# Patient Record
Sex: FEMALE | Race: WHITE | NOT HISPANIC OR LATINO | Employment: OTHER | URBAN - METROPOLITAN AREA
[De-identification: names, ages, dates, MRNs, and addresses within clinical notes are randomized per-mention and may not be internally consistent; named-entity substitution may affect disease eponyms.]

---

## 2017-01-01 ENCOUNTER — HOSPITAL ENCOUNTER (INPATIENT)
Facility: HOSPITAL | Age: 82
LOS: 5 days | Discharge: NURSING, REHAB OR LONG-TERM CARE | DRG: 683 | End: 2017-01-06
Attending: EMERGENCY MEDICINE | Admitting: FAMILY MEDICINE
Payer: COMMERCIAL

## 2017-01-01 ENCOUNTER — APPOINTMENT (EMERGENCY)
Dept: CT IMAGING | Facility: HOSPITAL | Age: 82
DRG: 683 | End: 2017-01-01
Payer: COMMERCIAL

## 2017-01-01 ENCOUNTER — APPOINTMENT (EMERGENCY)
Dept: RADIOLOGY | Facility: HOSPITAL | Age: 82
DRG: 683 | End: 2017-01-01
Payer: COMMERCIAL

## 2017-01-01 DIAGNOSIS — N39.0 URINARY TRACT INFECTION: Primary | ICD-10-CM

## 2017-01-01 DIAGNOSIS — N17.9 ACUTE KIDNEY INJURY (HCC): ICD-10-CM

## 2017-01-01 DIAGNOSIS — I35.0 AORTIC VALVE STENOSIS, UNSPECIFIED ETIOLOGY: ICD-10-CM

## 2017-01-01 DIAGNOSIS — G93.41 METABOLIC ENCEPHALOPATHY: ICD-10-CM

## 2017-01-01 DIAGNOSIS — I24.8 DEMAND ISCHEMIA (HCC): ICD-10-CM

## 2017-01-01 DIAGNOSIS — R77.8 ELEVATED TROPONIN: ICD-10-CM

## 2017-01-01 PROBLEM — D69.6 THROMBOCYTOPENIA (HCC): Status: ACTIVE | Noted: 2017-01-01

## 2017-01-01 LAB
ALBUMIN SERPL BCP-MCNC: 3.1 G/DL (ref 3.5–5)
ALP SERPL-CCNC: 60 U/L (ref 46–116)
ALT SERPL W P-5'-P-CCNC: 15 U/L (ref 12–78)
ANION GAP SERPL CALCULATED.3IONS-SCNC: 11 MMOL/L (ref 4–13)
AST SERPL W P-5'-P-CCNC: 26 U/L (ref 5–45)
BACTERIA UR QL AUTO: ABNORMAL /HPF
BASOPHILS # BLD AUTO: 0.02 THOUSANDS/ΜL (ref 0–0.1)
BASOPHILS NFR BLD AUTO: 0 % (ref 0–1)
BILIRUB SERPL-MCNC: 0.6 MG/DL (ref 0.2–1)
BILIRUB UR QL STRIP: NEGATIVE
BUN SERPL-MCNC: 56 MG/DL (ref 5–25)
CALCIUM SERPL-MCNC: 8.5 MG/DL (ref 8.3–10.1)
CHLORIDE SERPL-SCNC: 107 MMOL/L (ref 100–108)
CK MB SERPL-MCNC: 1.8 % (ref 0–2.5)
CK MB SERPL-MCNC: 3.4 NG/ML (ref 0–5)
CK SERPL-CCNC: 187 U/L (ref 26–192)
CLARITY UR: CLEAR
CO2 SERPL-SCNC: 25 MMOL/L (ref 21–32)
COLOR UR: YELLOW
CREAT SERPL-MCNC: 1.91 MG/DL (ref 0.6–1.3)
EOSINOPHIL # BLD AUTO: 0.08 THOUSAND/ΜL (ref 0–0.61)
EOSINOPHIL NFR BLD AUTO: 1 % (ref 0–6)
ERYTHROCYTE [DISTWIDTH] IN BLOOD BY AUTOMATED COUNT: 17.7 % (ref 11.6–15.1)
GFR SERPL CREATININE-BSD FRML MDRD: 24.9 ML/MIN/1.73SQ M
GLUCOSE SERPL-MCNC: 100 MG/DL (ref 65–140)
GLUCOSE SERPL-MCNC: 163 MG/DL (ref 65–140)
GLUCOSE SERPL-MCNC: 87 MG/DL (ref 65–140)
GLUCOSE UR STRIP-MCNC: NEGATIVE MG/DL
HCT VFR BLD AUTO: 30.1 % (ref 34.8–46.1)
HGB BLD-MCNC: 9.3 G/DL (ref 11.5–15.4)
HGB UR QL STRIP.AUTO: ABNORMAL
KETONES UR STRIP-MCNC: NEGATIVE MG/DL
LEUKOCYTE ESTERASE UR QL STRIP: ABNORMAL
LYMPHOCYTES # BLD AUTO: 2.2 THOUSANDS/ΜL (ref 0.6–4.47)
LYMPHOCYTES NFR BLD AUTO: 24 % (ref 14–44)
MCH RBC QN AUTO: 26.9 PG (ref 26.8–34.3)
MCHC RBC AUTO-ENTMCNC: 30.9 G/DL (ref 31.4–37.4)
MCV RBC AUTO: 87 FL (ref 82–98)
MONOCYTES # BLD AUTO: 0.83 THOUSAND/ΜL (ref 0.17–1.22)
MONOCYTES NFR BLD AUTO: 9 % (ref 4–12)
NEUTROPHILS # BLD AUTO: 5.94 THOUSANDS/ΜL (ref 1.85–7.62)
NEUTS SEG NFR BLD AUTO: 65 % (ref 43–75)
NITRITE UR QL STRIP: NEGATIVE
NON-SQ EPI CELLS URNS QL MICRO: ABNORMAL /HPF
NRBC BLD AUTO-RTO: 0 /100 WBCS
PH UR STRIP.AUTO: 5.5 [PH] (ref 4.5–8)
PLATELET # BLD AUTO: 132 THOUSANDS/UL (ref 149–390)
PLATELET # BLD AUTO: 135 THOUSANDS/UL (ref 149–390)
PMV BLD AUTO: 13.3 FL (ref 8.9–12.7)
PMV BLD AUTO: 13.9 FL (ref 8.9–12.7)
POTASSIUM SERPL-SCNC: 4.7 MMOL/L (ref 3.5–5.3)
PROT SERPL-MCNC: 6.9 G/DL (ref 6.4–8.2)
PROT UR STRIP-MCNC: NEGATIVE MG/DL
RBC # BLD AUTO: 3.46 MILLION/UL (ref 3.81–5.12)
RBC #/AREA URNS AUTO: ABNORMAL /HPF
SODIUM SERPL-SCNC: 143 MMOL/L (ref 136–145)
SP GR UR STRIP.AUTO: 1.02 (ref 1–1.03)
TROPONIN I SERPL-MCNC: 0.08 NG/ML
TROPONIN I SERPL-MCNC: 0.08 NG/ML
TROPONIN I SERPL-MCNC: 0.12 NG/ML
UROBILINOGEN UR QL STRIP.AUTO: 0.2 E.U./DL
WBC # BLD AUTO: 9.11 THOUSAND/UL (ref 4.31–10.16)
WBC #/AREA URNS AUTO: ABNORMAL /HPF
WBC CLUMPS # UR AUTO: PRESENT /UL

## 2017-01-01 PROCEDURE — 93005 ELECTROCARDIOGRAM TRACING: CPT | Performed by: EMERGENCY MEDICINE

## 2017-01-01 PROCEDURE — 84484 ASSAY OF TROPONIN QUANT: CPT | Performed by: FAMILY MEDICINE

## 2017-01-01 PROCEDURE — 82948 REAGENT STRIP/BLOOD GLUCOSE: CPT

## 2017-01-01 PROCEDURE — 70450 CT HEAD/BRAIN W/O DYE: CPT

## 2017-01-01 PROCEDURE — 87086 URINE CULTURE/COLONY COUNT: CPT | Performed by: EMERGENCY MEDICINE

## 2017-01-01 PROCEDURE — 71010 HB CHEST X-RAY 1 VIEW FRONTAL (PORTABLE): CPT

## 2017-01-01 PROCEDURE — 36415 COLL VENOUS BLD VENIPUNCTURE: CPT | Performed by: EMERGENCY MEDICINE

## 2017-01-01 PROCEDURE — 82553 CREATINE MB FRACTION: CPT | Performed by: EMERGENCY MEDICINE

## 2017-01-01 PROCEDURE — 85049 AUTOMATED PLATELET COUNT: CPT | Performed by: FAMILY MEDICINE

## 2017-01-01 PROCEDURE — 80053 COMPREHEN METABOLIC PANEL: CPT | Performed by: EMERGENCY MEDICINE

## 2017-01-01 PROCEDURE — 81001 URINALYSIS AUTO W/SCOPE: CPT | Performed by: EMERGENCY MEDICINE

## 2017-01-01 PROCEDURE — 99285 EMERGENCY DEPT VISIT HI MDM: CPT

## 2017-01-01 PROCEDURE — 72125 CT NECK SPINE W/O DYE: CPT

## 2017-01-01 PROCEDURE — 84484 ASSAY OF TROPONIN QUANT: CPT | Performed by: EMERGENCY MEDICINE

## 2017-01-01 PROCEDURE — 73502 X-RAY EXAM HIP UNI 2-3 VIEWS: CPT

## 2017-01-01 PROCEDURE — 82550 ASSAY OF CK (CPK): CPT | Performed by: EMERGENCY MEDICINE

## 2017-01-01 PROCEDURE — 85025 COMPLETE CBC W/AUTO DIFF WBC: CPT | Performed by: EMERGENCY MEDICINE

## 2017-01-01 RX ORDER — ATORVASTATIN CALCIUM 40 MG/1
40 TABLET, FILM COATED ORAL DAILY
Status: DISCONTINUED | OUTPATIENT
Start: 2017-01-01 | End: 2017-01-06 | Stop reason: HOSPADM

## 2017-01-01 RX ORDER — CHOLESTYRAMINE LIGHT 4 G/5.7G
4 POWDER, FOR SUSPENSION ORAL 3 TIMES DAILY
Status: DISCONTINUED | OUTPATIENT
Start: 2017-01-01 | End: 2017-01-06 | Stop reason: HOSPADM

## 2017-01-01 RX ORDER — DOXAZOSIN MESYLATE 1 MG/1
1 TABLET ORAL
Status: DISCONTINUED | OUTPATIENT
Start: 2017-01-01 | End: 2017-01-06 | Stop reason: HOSPADM

## 2017-01-01 RX ORDER — LATANOPROST 50 UG/ML
1 SOLUTION/ DROPS OPHTHALMIC
Status: DISCONTINUED | OUTPATIENT
Start: 2017-01-01 | End: 2017-01-06 | Stop reason: HOSPADM

## 2017-01-01 RX ORDER — METOPROLOL SUCCINATE 25 MG/1
25 TABLET, EXTENDED RELEASE ORAL DAILY
Status: DISCONTINUED | OUTPATIENT
Start: 2017-01-01 | End: 2017-01-06 | Stop reason: HOSPADM

## 2017-01-01 RX ORDER — ACETAMINOPHEN 325 MG/1
650 TABLET ORAL EVERY 6 HOURS PRN
Status: DISCONTINUED | OUTPATIENT
Start: 2017-01-01 | End: 2017-01-06 | Stop reason: HOSPADM

## 2017-01-01 RX ORDER — SODIUM CHLORIDE 9 MG/ML
75 INJECTION, SOLUTION INTRAVENOUS CONTINUOUS
Status: DISCONTINUED | OUTPATIENT
Start: 2017-01-01 | End: 2017-01-02

## 2017-01-01 RX ORDER — HEPARIN SODIUM 5000 [USP'U]/ML
5000 INJECTION, SOLUTION INTRAVENOUS; SUBCUTANEOUS EVERY 8 HOURS SCHEDULED
Status: DISCONTINUED | OUTPATIENT
Start: 2017-01-01 | End: 2017-01-06 | Stop reason: HOSPADM

## 2017-01-01 RX ORDER — NYSTATIN 100000 [USP'U]/G
1 POWDER TOPICAL 2 TIMES DAILY
Status: DISCONTINUED | OUTPATIENT
Start: 2017-01-01 | End: 2017-01-06 | Stop reason: HOSPADM

## 2017-01-01 RX ORDER — ASPIRIN 81 MG/1
81 TABLET, CHEWABLE ORAL DAILY
Status: DISCONTINUED | OUTPATIENT
Start: 2017-01-01 | End: 2017-01-06 | Stop reason: HOSPADM

## 2017-01-01 RX ORDER — ONDANSETRON 2 MG/ML
4 INJECTION INTRAMUSCULAR; INTRAVENOUS EVERY 6 HOURS PRN
Status: DISCONTINUED | OUTPATIENT
Start: 2017-01-01 | End: 2017-01-06 | Stop reason: HOSPADM

## 2017-01-01 RX ORDER — OXYBUTYNIN CHLORIDE 5 MG/1
20 TABLET, EXTENDED RELEASE ORAL DAILY
Status: DISCONTINUED | OUTPATIENT
Start: 2017-01-02 | End: 2017-01-06 | Stop reason: HOSPADM

## 2017-01-01 RX ADMIN — CHOLESTYRAMINE 4 G: 4 POWDER, FOR SUSPENSION ORAL at 17:29

## 2017-01-01 RX ADMIN — SODIUM CHLORIDE 75 ML/HR: 0.9 INJECTION, SOLUTION INTRAVENOUS at 18:08

## 2017-01-01 RX ADMIN — METOPROLOL SUCCINATE 25 MG: 25 TABLET, EXTENDED RELEASE ORAL at 17:29

## 2017-01-01 RX ADMIN — HEPARIN SODIUM 5000 UNITS: 5000 INJECTION, SOLUTION INTRAVENOUS; SUBCUTANEOUS at 21:56

## 2017-01-01 RX ADMIN — ATORVASTATIN CALCIUM 40 MG: 40 TABLET, FILM COATED ORAL at 17:29

## 2017-01-01 RX ADMIN — CEFAZOLIN SODIUM 500 MG: 1 INJECTION, POWDER, FOR SOLUTION INTRAMUSCULAR; INTRAVENOUS at 18:15

## 2017-01-01 RX ADMIN — ASPIRIN 81 MG CHEWABLE TABLET 81 MG: 81 TABLET CHEWABLE at 17:29

## 2017-01-01 RX ADMIN — DOXAZOSIN MESYLATE 1 MG: 1 TABLET ORAL at 22:01

## 2017-01-01 RX ADMIN — LATANOPROST 1 DROP: 50 SOLUTION OPHTHALMIC at 21:56

## 2017-01-01 RX ADMIN — ACETAMINOPHEN 650 MG: 325 TABLET ORAL at 22:00

## 2017-01-01 RX ADMIN — SODIUM CHLORIDE 1000 ML: 0.9 INJECTION, SOLUTION INTRAVENOUS at 12:25

## 2017-01-01 RX ADMIN — HEPARIN SODIUM 5000 UNITS: 5000 INJECTION, SOLUTION INTRAVENOUS; SUBCUTANEOUS at 18:17

## 2017-01-01 RX ADMIN — CEFTRIAXONE 1000 MG: 1 INJECTION, SOLUTION INTRAVENOUS at 12:23

## 2017-01-02 LAB
ANION GAP SERPL CALCULATED.3IONS-SCNC: 8 MMOL/L (ref 4–13)
BUN SERPL-MCNC: 33 MG/DL (ref 5–25)
CALCIUM SERPL-MCNC: 7.8 MG/DL (ref 8.3–10.1)
CHLORIDE SERPL-SCNC: 113 MMOL/L (ref 100–108)
CO2 SERPL-SCNC: 25 MMOL/L (ref 21–32)
CREAT SERPL-MCNC: 1.28 MG/DL (ref 0.6–1.3)
ERYTHROCYTE [DISTWIDTH] IN BLOOD BY AUTOMATED COUNT: 17.7 % (ref 11.6–15.1)
GFR SERPL CREATININE-BSD FRML MDRD: 39.4 ML/MIN/1.73SQ M
GLUCOSE SERPL-MCNC: 109 MG/DL (ref 65–140)
GLUCOSE SERPL-MCNC: 131 MG/DL (ref 65–140)
GLUCOSE SERPL-MCNC: 154 MG/DL (ref 65–140)
GLUCOSE SERPL-MCNC: 87 MG/DL (ref 65–140)
GLUCOSE SERPL-MCNC: 90 MG/DL (ref 65–140)
HCT VFR BLD AUTO: 29.1 % (ref 34.8–46.1)
HGB BLD-MCNC: 8.7 G/DL (ref 11.5–15.4)
MCH RBC QN AUTO: 26.3 PG (ref 26.8–34.3)
MCHC RBC AUTO-ENTMCNC: 29.9 G/DL (ref 31.4–37.4)
MCV RBC AUTO: 88 FL (ref 82–98)
PLATELET # BLD AUTO: 142 THOUSANDS/UL (ref 149–390)
PMV BLD AUTO: 12.8 FL (ref 8.9–12.7)
POTASSIUM SERPL-SCNC: 4.2 MMOL/L (ref 3.5–5.3)
RBC # BLD AUTO: 3.31 MILLION/UL (ref 3.81–5.12)
SODIUM SERPL-SCNC: 146 MMOL/L (ref 136–145)
TROPONIN I SERPL-MCNC: 0.2 NG/ML
WBC # BLD AUTO: 7.11 THOUSAND/UL (ref 4.31–10.16)

## 2017-01-02 PROCEDURE — 84484 ASSAY OF TROPONIN QUANT: CPT | Performed by: NURSE PRACTITIONER

## 2017-01-02 PROCEDURE — 80048 BASIC METABOLIC PNL TOTAL CA: CPT | Performed by: FAMILY MEDICINE

## 2017-01-02 PROCEDURE — 85027 COMPLETE CBC AUTOMATED: CPT | Performed by: FAMILY MEDICINE

## 2017-01-02 PROCEDURE — 82948 REAGENT STRIP/BLOOD GLUCOSE: CPT

## 2017-01-02 RX ADMIN — HEPARIN SODIUM 5000 UNITS: 5000 INJECTION, SOLUTION INTRAVENOUS; SUBCUTANEOUS at 05:28

## 2017-01-02 RX ADMIN — NYSTATIN 1 APPLICATION: 100000 POWDER TOPICAL at 17:00

## 2017-01-02 RX ADMIN — CHOLESTYRAMINE 4 G: 4 POWDER, FOR SUSPENSION ORAL at 16:58

## 2017-01-02 RX ADMIN — METOPROLOL SUCCINATE 25 MG: 25 TABLET, EXTENDED RELEASE ORAL at 09:02

## 2017-01-02 RX ADMIN — ASPIRIN 81 MG CHEWABLE TABLET 81 MG: 81 TABLET CHEWABLE at 09:02

## 2017-01-02 RX ADMIN — LATANOPROST 1 DROP: 50 SOLUTION OPHTHALMIC at 22:18

## 2017-01-02 RX ADMIN — CHOLESTYRAMINE 4 G: 4 POWDER, FOR SUSPENSION ORAL at 22:16

## 2017-01-02 RX ADMIN — CEFAZOLIN SODIUM 500 MG: 1 INJECTION, POWDER, FOR SOLUTION INTRAMUSCULAR; INTRAVENOUS at 16:52

## 2017-01-02 RX ADMIN — CEFAZOLIN SODIUM 500 MG: 1 INJECTION, POWDER, FOR SOLUTION INTRAMUSCULAR; INTRAVENOUS at 04:05

## 2017-01-02 RX ADMIN — HEPARIN SODIUM 5000 UNITS: 5000 INJECTION, SOLUTION INTRAVENOUS; SUBCUTANEOUS at 22:19

## 2017-01-02 RX ADMIN — SODIUM CHLORIDE 75 ML/HR: 0.9 INJECTION, SOLUTION INTRAVENOUS at 10:05

## 2017-01-02 RX ADMIN — INSULIN LISPRO 1 UNITS: 100 INJECTION, SOLUTION INTRAVENOUS; SUBCUTANEOUS at 11:53

## 2017-01-02 RX ADMIN — OXYBUTYNIN CHLORIDE 20 MG: 5 TABLET, EXTENDED RELEASE ORAL at 09:02

## 2017-01-02 RX ADMIN — CHOLESTYRAMINE 4 G: 4 POWDER, FOR SUSPENSION ORAL at 09:02

## 2017-01-02 RX ADMIN — ATORVASTATIN CALCIUM 40 MG: 40 TABLET, FILM COATED ORAL at 09:02

## 2017-01-02 RX ADMIN — HEPARIN SODIUM 5000 UNITS: 5000 INJECTION, SOLUTION INTRAVENOUS; SUBCUTANEOUS at 13:23

## 2017-01-02 RX ADMIN — NYSTATIN 1 APPLICATION: 100000 POWDER TOPICAL at 08:54

## 2017-01-02 RX ADMIN — DOXAZOSIN MESYLATE 1 MG: 1 TABLET ORAL at 22:19

## 2017-01-03 LAB
ANION GAP SERPL CALCULATED.3IONS-SCNC: 6 MMOL/L (ref 4–13)
ATRIAL RATE: 99 BPM
BACTERIA UR CULT: NORMAL
BUN SERPL-MCNC: 24 MG/DL (ref 5–25)
CALCIUM SERPL-MCNC: 7.8 MG/DL (ref 8.3–10.1)
CHLORIDE SERPL-SCNC: 112 MMOL/L (ref 100–108)
CO2 SERPL-SCNC: 24 MMOL/L (ref 21–32)
CREAT SERPL-MCNC: 1.04 MG/DL (ref 0.6–1.3)
ERYTHROCYTE [DISTWIDTH] IN BLOOD BY AUTOMATED COUNT: 17.3 % (ref 11.6–15.1)
GFR SERPL CREATININE-BSD FRML MDRD: 50.1 ML/MIN/1.73SQ M
GLUCOSE SERPL-MCNC: 101 MG/DL (ref 65–140)
GLUCOSE SERPL-MCNC: 130 MG/DL (ref 65–140)
GLUCOSE SERPL-MCNC: 134 MG/DL (ref 65–140)
GLUCOSE SERPL-MCNC: 166 MG/DL (ref 65–140)
GLUCOSE SERPL-MCNC: 89 MG/DL (ref 65–140)
HCT VFR BLD AUTO: 27.5 % (ref 34.8–46.1)
HGB BLD-MCNC: 8.6 G/DL (ref 11.5–15.4)
MCH RBC QN AUTO: 27.7 PG (ref 26.8–34.3)
MCHC RBC AUTO-ENTMCNC: 31.3 G/DL (ref 31.4–37.4)
MCV RBC AUTO: 88 FL (ref 82–98)
P AXIS: 55 DEGREES
PLATELET # BLD AUTO: 131 THOUSANDS/UL (ref 149–390)
PMV BLD AUTO: 12 FL (ref 8.9–12.7)
POTASSIUM SERPL-SCNC: 4.3 MMOL/L (ref 3.5–5.3)
PR INTERVAL: 204 MS
QRS AXIS: 45 DEGREES
QRSD INTERVAL: 102 MS
QT INTERVAL: 376 MS
QTC INTERVAL: 482 MS
RBC # BLD AUTO: 3.11 MILLION/UL (ref 3.81–5.12)
SODIUM SERPL-SCNC: 142 MMOL/L (ref 136–145)
T WAVE AXIS: -6 DEGREES
VENTRICULAR RATE: 99 BPM
WBC # BLD AUTO: 6.46 THOUSAND/UL (ref 4.31–10.16)

## 2017-01-03 PROCEDURE — 82948 REAGENT STRIP/BLOOD GLUCOSE: CPT

## 2017-01-03 PROCEDURE — 80048 BASIC METABOLIC PNL TOTAL CA: CPT | Performed by: NURSE PRACTITIONER

## 2017-01-03 PROCEDURE — G8979 MOBILITY GOAL STATUS: HCPCS

## 2017-01-03 PROCEDURE — 92610 EVALUATE SWALLOWING FUNCTION: CPT

## 2017-01-03 PROCEDURE — 97163 PT EVAL HIGH COMPLEX 45 MIN: CPT

## 2017-01-03 PROCEDURE — 85027 COMPLETE CBC AUTOMATED: CPT | Performed by: NURSE PRACTITIONER

## 2017-01-03 PROCEDURE — G8978 MOBILITY CURRENT STATUS: HCPCS

## 2017-01-03 RX ADMIN — DOXAZOSIN MESYLATE 1 MG: 1 TABLET ORAL at 21:04

## 2017-01-03 RX ADMIN — CHOLESTYRAMINE 4 G: 4 POWDER, FOR SUSPENSION ORAL at 16:47

## 2017-01-03 RX ADMIN — LATANOPROST 1 DROP: 50 SOLUTION OPHTHALMIC at 21:04

## 2017-01-03 RX ADMIN — CHOLESTYRAMINE 4 G: 4 POWDER, FOR SUSPENSION ORAL at 09:47

## 2017-01-03 RX ADMIN — HEPARIN SODIUM 5000 UNITS: 5000 INJECTION, SOLUTION INTRAVENOUS; SUBCUTANEOUS at 06:33

## 2017-01-03 RX ADMIN — CEFAZOLIN SODIUM 500 MG: 1 INJECTION, POWDER, FOR SOLUTION INTRAMUSCULAR; INTRAVENOUS at 04:22

## 2017-01-03 RX ADMIN — HEPARIN SODIUM 5000 UNITS: 5000 INJECTION, SOLUTION INTRAVENOUS; SUBCUTANEOUS at 21:04

## 2017-01-03 RX ADMIN — CHOLESTYRAMINE 4 G: 4 POWDER, FOR SUSPENSION ORAL at 21:05

## 2017-01-03 RX ADMIN — METOPROLOL SUCCINATE 25 MG: 25 TABLET, EXTENDED RELEASE ORAL at 09:47

## 2017-01-03 RX ADMIN — NYSTATIN 1 APPLICATION: 100000 POWDER TOPICAL at 09:48

## 2017-01-03 RX ADMIN — ATORVASTATIN CALCIUM 40 MG: 40 TABLET, FILM COATED ORAL at 09:46

## 2017-01-03 RX ADMIN — OXYBUTYNIN CHLORIDE 20 MG: 5 TABLET, EXTENDED RELEASE ORAL at 09:46

## 2017-01-03 RX ADMIN — ASPIRIN 81 MG CHEWABLE TABLET 81 MG: 81 TABLET CHEWABLE at 09:47

## 2017-01-03 RX ADMIN — CEFAZOLIN SODIUM 500 MG: 1 INJECTION, POWDER, FOR SOLUTION INTRAMUSCULAR; INTRAVENOUS at 16:46

## 2017-01-03 RX ADMIN — NYSTATIN 1 APPLICATION: 100000 POWDER TOPICAL at 18:43

## 2017-01-03 RX ADMIN — HEPARIN SODIUM 5000 UNITS: 5000 INJECTION, SOLUTION INTRAVENOUS; SUBCUTANEOUS at 14:11

## 2017-01-04 LAB
GLUCOSE SERPL-MCNC: 104 MG/DL (ref 65–140)
GLUCOSE SERPL-MCNC: 113 MG/DL (ref 65–140)
GLUCOSE SERPL-MCNC: 163 MG/DL (ref 65–140)
GLUCOSE SERPL-MCNC: 183 MG/DL (ref 65–140)

## 2017-01-04 PROCEDURE — 97110 THERAPEUTIC EXERCISES: CPT

## 2017-01-04 PROCEDURE — 97116 GAIT TRAINING THERAPY: CPT

## 2017-01-04 PROCEDURE — 82948 REAGENT STRIP/BLOOD GLUCOSE: CPT

## 2017-01-04 RX ADMIN — METOPROLOL SUCCINATE 25 MG: 25 TABLET, EXTENDED RELEASE ORAL at 09:04

## 2017-01-04 RX ADMIN — CHOLESTYRAMINE 4 G: 4 POWDER, FOR SUSPENSION ORAL at 09:04

## 2017-01-04 RX ADMIN — INSULIN LISPRO 1 UNITS: 100 INJECTION, SOLUTION INTRAVENOUS; SUBCUTANEOUS at 17:40

## 2017-01-04 RX ADMIN — CHOLESTYRAMINE 4 G: 4 POWDER, FOR SUSPENSION ORAL at 21:52

## 2017-01-04 RX ADMIN — OXYBUTYNIN CHLORIDE 20 MG: 5 TABLET, EXTENDED RELEASE ORAL at 09:05

## 2017-01-04 RX ADMIN — HEPARIN SODIUM 5000 UNITS: 5000 INJECTION, SOLUTION INTRAVENOUS; SUBCUTANEOUS at 14:46

## 2017-01-04 RX ADMIN — ATORVASTATIN CALCIUM 40 MG: 40 TABLET, FILM COATED ORAL at 09:04

## 2017-01-04 RX ADMIN — LATANOPROST 1 DROP: 50 SOLUTION OPHTHALMIC at 21:59

## 2017-01-04 RX ADMIN — CHOLESTYRAMINE 4 G: 4 POWDER, FOR SUSPENSION ORAL at 17:39

## 2017-01-04 RX ADMIN — DOXAZOSIN MESYLATE 1 MG: 1 TABLET ORAL at 21:51

## 2017-01-04 RX ADMIN — ASPIRIN 81 MG CHEWABLE TABLET 81 MG: 81 TABLET CHEWABLE at 09:05

## 2017-01-04 RX ADMIN — HEPARIN SODIUM 5000 UNITS: 5000 INJECTION, SOLUTION INTRAVENOUS; SUBCUTANEOUS at 21:52

## 2017-01-04 RX ADMIN — CEFAZOLIN SODIUM 500 MG: 1 INJECTION, POWDER, FOR SOLUTION INTRAMUSCULAR; INTRAVENOUS at 04:38

## 2017-01-04 RX ADMIN — HEPARIN SODIUM 5000 UNITS: 5000 INJECTION, SOLUTION INTRAVENOUS; SUBCUTANEOUS at 05:44

## 2017-01-04 RX ADMIN — CEFAZOLIN SODIUM 500 MG: 1 INJECTION, POWDER, FOR SOLUTION INTRAMUSCULAR; INTRAVENOUS at 17:38

## 2017-01-04 RX ADMIN — NYSTATIN 1 APPLICATION: 100000 POWDER TOPICAL at 09:06

## 2017-01-04 RX ADMIN — NYSTATIN 1 APPLICATION: 100000 POWDER TOPICAL at 17:40

## 2017-01-05 LAB
GLUCOSE SERPL-MCNC: 114 MG/DL (ref 65–140)
GLUCOSE SERPL-MCNC: 122 MG/DL (ref 65–140)
GLUCOSE SERPL-MCNC: 97 MG/DL (ref 65–140)
GLUCOSE SERPL-MCNC: 99 MG/DL (ref 65–140)

## 2017-01-05 PROCEDURE — 82948 REAGENT STRIP/BLOOD GLUCOSE: CPT

## 2017-01-05 PROCEDURE — 97110 THERAPEUTIC EXERCISES: CPT

## 2017-01-05 PROCEDURE — 97530 THERAPEUTIC ACTIVITIES: CPT

## 2017-01-05 PROCEDURE — 97116 GAIT TRAINING THERAPY: CPT

## 2017-01-05 RX ADMIN — METOPROLOL SUCCINATE 25 MG: 25 TABLET, EXTENDED RELEASE ORAL at 08:16

## 2017-01-05 RX ADMIN — CEFAZOLIN SODIUM 500 MG: 1 INJECTION, POWDER, FOR SOLUTION INTRAMUSCULAR; INTRAVENOUS at 17:04

## 2017-01-05 RX ADMIN — CEFAZOLIN SODIUM 500 MG: 1 INJECTION, POWDER, FOR SOLUTION INTRAMUSCULAR; INTRAVENOUS at 04:35

## 2017-01-05 RX ADMIN — NYSTATIN 1 APPLICATION: 100000 POWDER TOPICAL at 17:05

## 2017-01-05 RX ADMIN — OXYBUTYNIN CHLORIDE 20 MG: 5 TABLET, EXTENDED RELEASE ORAL at 08:15

## 2017-01-05 RX ADMIN — ASPIRIN 81 MG CHEWABLE TABLET 81 MG: 81 TABLET CHEWABLE at 08:16

## 2017-01-05 RX ADMIN — NYSTATIN 1 APPLICATION: 100000 POWDER TOPICAL at 08:23

## 2017-01-05 RX ADMIN — LATANOPROST 1 DROP: 50 SOLUTION OPHTHALMIC at 22:27

## 2017-01-05 RX ADMIN — DOXAZOSIN MESYLATE 1 MG: 1 TABLET ORAL at 22:20

## 2017-01-05 RX ADMIN — HEPARIN SODIUM 5000 UNITS: 5000 INJECTION, SOLUTION INTRAVENOUS; SUBCUTANEOUS at 14:50

## 2017-01-05 RX ADMIN — CHOLESTYRAMINE 4 G: 4 POWDER, FOR SUSPENSION ORAL at 08:16

## 2017-01-05 RX ADMIN — CHOLESTYRAMINE 4 G: 4 POWDER, FOR SUSPENSION ORAL at 22:17

## 2017-01-05 RX ADMIN — ATORVASTATIN CALCIUM 40 MG: 40 TABLET, FILM COATED ORAL at 08:16

## 2017-01-05 RX ADMIN — CHOLESTYRAMINE 4 G: 4 POWDER, FOR SUSPENSION ORAL at 17:04

## 2017-01-05 RX ADMIN — HEPARIN SODIUM 5000 UNITS: 5000 INJECTION, SOLUTION INTRAVENOUS; SUBCUTANEOUS at 22:20

## 2017-01-05 RX ADMIN — HEPARIN SODIUM 5000 UNITS: 5000 INJECTION, SOLUTION INTRAVENOUS; SUBCUTANEOUS at 05:26

## 2017-01-06 VITALS
DIASTOLIC BLOOD PRESSURE: 68 MMHG | RESPIRATION RATE: 18 BRPM | SYSTOLIC BLOOD PRESSURE: 151 MMHG | TEMPERATURE: 98.5 F | HEIGHT: 60 IN | OXYGEN SATURATION: 95 % | WEIGHT: 154.76 LBS | HEART RATE: 64 BPM | BODY MASS INDEX: 30.38 KG/M2

## 2017-01-06 LAB
ANION GAP SERPL CALCULATED.3IONS-SCNC: 4 MMOL/L (ref 4–13)
BUN SERPL-MCNC: 15 MG/DL (ref 5–25)
CALCIUM SERPL-MCNC: 8.3 MG/DL (ref 8.3–10.1)
CHLORIDE SERPL-SCNC: 113 MMOL/L (ref 100–108)
CO2 SERPL-SCNC: 26 MMOL/L (ref 21–32)
CREAT SERPL-MCNC: 1.01 MG/DL (ref 0.6–1.3)
ERYTHROCYTE [DISTWIDTH] IN BLOOD BY AUTOMATED COUNT: 17.2 % (ref 11.6–15.1)
GFR SERPL CREATININE-BSD FRML MDRD: 51.8 ML/MIN/1.73SQ M
GLUCOSE SERPL-MCNC: 178 MG/DL (ref 65–140)
GLUCOSE SERPL-MCNC: 85 MG/DL (ref 65–140)
GLUCOSE SERPL-MCNC: 88 MG/DL (ref 65–140)
GLUCOSE SERPL-MCNC: 94 MG/DL (ref 65–140)
HCT VFR BLD AUTO: 28 % (ref 34.8–46.1)
HGB BLD-MCNC: 8.5 G/DL (ref 11.5–15.4)
MCH RBC QN AUTO: 26.7 PG (ref 26.8–34.3)
MCHC RBC AUTO-ENTMCNC: 30.4 G/DL (ref 31.4–37.4)
MCV RBC AUTO: 88 FL (ref 82–98)
PLATELET # BLD AUTO: 145 THOUSANDS/UL (ref 149–390)
PMV BLD AUTO: 13.6 FL (ref 8.9–12.7)
POTASSIUM SERPL-SCNC: 5.7 MMOL/L (ref 3.5–5.3)
RBC # BLD AUTO: 3.18 MILLION/UL (ref 3.81–5.12)
SODIUM SERPL-SCNC: 143 MMOL/L (ref 136–145)
WBC # BLD AUTO: 7.81 THOUSAND/UL (ref 4.31–10.16)

## 2017-01-06 PROCEDURE — 80048 BASIC METABOLIC PNL TOTAL CA: CPT | Performed by: NURSE PRACTITIONER

## 2017-01-06 PROCEDURE — 82948 REAGENT STRIP/BLOOD GLUCOSE: CPT

## 2017-01-06 PROCEDURE — 85027 COMPLETE CBC AUTOMATED: CPT | Performed by: NURSE PRACTITIONER

## 2017-01-06 RX ORDER — NYSTATIN 100000 [USP'U]/G
1 POWDER TOPICAL 2 TIMES DAILY
Qty: 60 APPLICATION | Refills: 0
Start: 2017-01-06 | End: 2017-07-03

## 2017-01-06 RX ORDER — CEFDINIR 300 MG/1
300 CAPSULE ORAL EVERY 12 HOURS
Qty: 10 CAPSULE | Refills: 0 | Status: SHIPPED | OUTPATIENT
Start: 2017-01-06 | End: 2017-01-11

## 2017-01-06 RX ADMIN — ATORVASTATIN CALCIUM 40 MG: 40 TABLET, FILM COATED ORAL at 09:07

## 2017-01-06 RX ADMIN — METOPROLOL SUCCINATE 25 MG: 25 TABLET, EXTENDED RELEASE ORAL at 09:07

## 2017-01-06 RX ADMIN — CHOLESTYRAMINE 4 G: 4 POWDER, FOR SUSPENSION ORAL at 09:07

## 2017-01-06 RX ADMIN — CEFAZOLIN SODIUM 500 MG: 1 INJECTION, POWDER, FOR SOLUTION INTRAMUSCULAR; INTRAVENOUS at 06:15

## 2017-01-06 RX ADMIN — HEPARIN SODIUM 5000 UNITS: 5000 INJECTION, SOLUTION INTRAVENOUS; SUBCUTANEOUS at 06:17

## 2017-01-06 RX ADMIN — HEPARIN SODIUM 5000 UNITS: 5000 INJECTION, SOLUTION INTRAVENOUS; SUBCUTANEOUS at 13:02

## 2017-01-06 RX ADMIN — ASPIRIN 81 MG CHEWABLE TABLET 81 MG: 81 TABLET CHEWABLE at 09:07

## 2017-01-06 RX ADMIN — OXYBUTYNIN CHLORIDE 20 MG: 5 TABLET, EXTENDED RELEASE ORAL at 09:07

## 2017-01-06 RX ADMIN — NYSTATIN 1 APPLICATION: 100000 POWDER TOPICAL at 09:08

## 2017-01-06 RX ADMIN — INSULIN LISPRO 1 UNITS: 100 INJECTION, SOLUTION INTRAVENOUS; SUBCUTANEOUS at 12:30

## 2017-02-15 ENCOUNTER — ALLSCRIPTS OFFICE VISIT (OUTPATIENT)
Dept: OTHER | Facility: OTHER | Age: 82
End: 2017-02-15

## 2017-02-28 ENCOUNTER — ALLSCRIPTS OFFICE VISIT (OUTPATIENT)
Dept: OTHER | Facility: OTHER | Age: 82
End: 2017-02-28

## 2017-05-24 RX ORDER — ACETAMINOPHEN 325 MG/1
650 TABLET ORAL ONCE
Status: COMPLETED | OUTPATIENT
Start: 2017-05-25 | End: 2017-05-25

## 2017-05-25 ENCOUNTER — HOSPITAL ENCOUNTER (OUTPATIENT)
Dept: INFUSION CENTER | Facility: HOSPITAL | Age: 82
Discharge: HOME | End: 2017-05-25
Payer: COMMERCIAL

## 2017-05-25 VITALS
TEMPERATURE: 98.5 F | OXYGEN SATURATION: 96 % | HEART RATE: 76 BPM | DIASTOLIC BLOOD PRESSURE: 71 MMHG | RESPIRATION RATE: 18 BRPM | SYSTOLIC BLOOD PRESSURE: 161 MMHG

## 2017-05-25 LAB
ABO GROUP BLD: NORMAL
BLD GP AB SCN SERPL QL: NEGATIVE
RH BLD: NEGATIVE
SPECIMEN EXPIRATION DATE: NORMAL

## 2017-05-25 PROCEDURE — 86900 BLOOD TYPING SEROLOGIC ABO: CPT | Performed by: FAMILY MEDICINE

## 2017-05-25 PROCEDURE — 86901 BLOOD TYPING SEROLOGIC RH(D): CPT | Performed by: FAMILY MEDICINE

## 2017-05-25 PROCEDURE — 36430 TRANSFUSION BLD/BLD COMPNT: CPT

## 2017-05-25 PROCEDURE — P9021 RED BLOOD CELLS UNIT: HCPCS

## 2017-05-25 PROCEDURE — 86850 RBC ANTIBODY SCREEN: CPT | Performed by: FAMILY MEDICINE

## 2017-05-25 PROCEDURE — 86920 COMPATIBILITY TEST SPIN: CPT

## 2017-05-25 RX ADMIN — ACETAMINOPHEN 650 MG: 325 TABLET ORAL at 12:52

## 2017-05-25 NOTE — [place]IVERS[place] TRANSF[place] FORM
[place]  (ALL ITEMS MUST BE COMPLETED)    1  TRANSFER FROM: [address]      TRANSFER TO: [facility]    2  DATE OF TRANSFER: 5/25/2017                        TIME OF TRANSFER: 1600    3  PATIENT NAME: Andreea Covarrubias,        YOB: 1929                             GENDER: female    4  LANGUAGE:   English    5  PHYSICIAN NAME:  No att  providers found                   PHONE: [phone]  CODE STATUS: Prior        Out of Hospital DNR Attached: No    7  :                                      :  Extended Emergency Contact Information  Primary Emergency Contact: Geovani Covarrubias  Address[facility] [address] [facility] [address] [place] [address] Phone: [phone]  Mobile Phone: [phone]  Relation: Jennifer [address] Representative/Proxy:  No           Legal Guardian:  No             NAME OF:           HEALTH CARE REPRESENTATIVE/PROXY:                                         OR           LEGAL GUARDIAN, IF NOT :                                               PHONE:  (Day)           (Night)                        (Cell)    8  REASON FOR TRANSFER: (Must include brief medical history and recent changes in physical function or cognition ) S/P BLOOD TRANSFUSION            V/S: /70  Pulse 74  Temp 98 3 °F (36 8 °C) (Tympanic)   Resp 16  SpO2 95%          PAIN: None    9  PRIMARY DIAGNOSIS: ANEMIA      Secondary Diagnosis:         Pacemaker: No      Internal Defib: No          Mental Health Diagnosis (if Applicable):    10  RESTRAINTS: No     11  RESPIRATORY NEEDS: None    12  ISOLATION/PRECAUTION: None    13  ALLERGY: Neosporin [neomycin-bacitracin zn-polymyx] and Sulfa antibiotics    14  SENSORY:       Vision Glasses    15  SKIN CONDITION: No Wounds    16  DIET: Regular    17  IV ACCESS: None    18  PERSONAL ITEMS SENT WITH PATIENT: Glasses    19  ATTACHED DOCUMENTS: MUST ATTACH CURRENT MEDICATION INFORMATION Discharge Summary    20  AT RISK ALERTS:Falls        HARM TO: N/A    21  WEIGHT BEARING STATUS:         Left Leg: Limited        Right Leg: Limited    22  MENTAL STATUS:Alert, Oriented and Forgetful    23  FUNCTION:        Walk: With Help        Transfer: With Help        Toilet: With Help        Feed: With Help    24  IMMUNIZATIONS/SCREENING:     There is no immunization history on file for this patient  25  BOWEL: Continent    26  BLADDER: Continent    27   SENDING FACILITY CONTACT: Natalie Roles RN                  Title: RN        Unit: [facility]        Phone: [phone] [address] (if known):        Title:        Unit:         Phone:         FORM PREFILLED BY (if applicable)       Title:       Unit:        Phone:         Deepika Flor RN      Title: RN      Phone: [phone]

## 2017-05-26 LAB
ABO GROUP BLD BPU: NORMAL
BPU ID: NORMAL
CROSSMATCH: NORMAL
UNIT DISPENSE STATUS: NORMAL
UNIT PRODUCT CODE: NORMAL
UNIT RH: NORMAL

## 2017-06-28 DIAGNOSIS — D64.9 ANEMIA: ICD-10-CM

## 2017-07-03 ENCOUNTER — APPOINTMENT (EMERGENCY)
Dept: RADIOLOGY | Facility: HOSPITAL | Age: 82
End: 2017-07-03
Payer: COMMERCIAL

## 2017-07-03 ENCOUNTER — HOSPITAL ENCOUNTER (EMERGENCY)
Facility: HOSPITAL | Age: 82
Discharge: NURSING, REHAB OR LONG-TERM CARE | End: 2017-07-03
Attending: EMERGENCY MEDICINE | Admitting: EMERGENCY MEDICINE
Payer: COMMERCIAL

## 2017-07-03 VITALS
HEIGHT: 64 IN | BODY MASS INDEX: 28.44 KG/M2 | OXYGEN SATURATION: 100 % | HEART RATE: 113 BPM | WEIGHT: 166.6 LBS | DIASTOLIC BLOOD PRESSURE: 51 MMHG | RESPIRATION RATE: 18 BRPM | TEMPERATURE: 99.5 F | SYSTOLIC BLOOD PRESSURE: 89 MMHG

## 2017-07-03 DIAGNOSIS — A41.9 SEPTIC SHOCK (HCC): Primary | ICD-10-CM

## 2017-07-03 DIAGNOSIS — R65.21 SEPTIC SHOCK (HCC): Primary | ICD-10-CM

## 2017-07-03 DIAGNOSIS — J18.9 PNEUMONIA OF LEFT LOWER LOBE DUE TO INFECTIOUS ORGANISM: ICD-10-CM

## 2017-07-03 DIAGNOSIS — Z51.5 ENCOUNTER FOR HOSPICE CARE: ICD-10-CM

## 2017-07-03 DIAGNOSIS — I95.9 HYPOTENSION, UNSPECIFIED HYPOTENSION TYPE: ICD-10-CM

## 2017-07-03 DIAGNOSIS — J96.00 ACUTE RESPIRATORY FAILURE, UNSPECIFIED WHETHER WITH HYPOXIA OR HYPERCAPNIA (HCC): ICD-10-CM

## 2017-07-03 LAB
ADDITIONAL SETTING: 12
ALBUMIN SERPL BCP-MCNC: 2.6 G/DL (ref 3.5–5)
ALP SERPL-CCNC: 63 U/L (ref 46–116)
ALT SERPL W P-5'-P-CCNC: 18 U/L (ref 12–78)
ANCILLARY VALUES: ABNORMAL
ANION GAP SERPL CALCULATED.3IONS-SCNC: 7 MMOL/L (ref 4–13)
APTT PPP: 29 SECONDS (ref 23–35)
ARTERIAL PATENCY WRIST A: ABNORMAL
AST SERPL W P-5'-P-CCNC: 22 U/L (ref 5–45)
ATRIAL RATE: 101 BPM
BASE EXCESS BLDA CALC-SCNC: 6 MMOL/L (ref -2–3)
BASOPHILS # BLD AUTO: 0.01 THOUSANDS/ΜL (ref 0–0.1)
BASOPHILS NFR BLD AUTO: 0 % (ref 0–1)
BILIRUB SERPL-MCNC: 0.4 MG/DL (ref 0.2–1)
BUN SERPL-MCNC: 33 MG/DL (ref 5–25)
CA-I BLD-SCNC: 1.15 MMOL/L (ref 1.12–1.32)
CALCIUM SERPL-MCNC: 8.5 MG/DL (ref 8.3–10.1)
CHLORIDE SERPL-SCNC: 104 MMOL/L (ref 100–108)
CO2 SERPL-SCNC: 32 MMOL/L (ref 21–32)
CREAT SERPL-MCNC: 1.22 MG/DL (ref 0.6–1.3)
DS:DELIVERY SYSTEM: ABNORMAL
EOSINOPHIL # BLD AUTO: 0.17 THOUSAND/ΜL (ref 0–0.61)
EOSINOPHIL NFR BLD AUTO: 2 % (ref 0–6)
ERYTHROCYTE [DISTWIDTH] IN BLOOD BY AUTOMATED COUNT: 14.8 % (ref 11.6–15.1)
FIO2 GAS DIL.REBREATH: 60 L
GFR SERPL CREATININE-BSD FRML MDRD: 41.7 ML/MIN/1.73SQ M
GLUCOSE SERPL-MCNC: 128 MG/DL (ref 65–140)
GLUCOSE SERPL-MCNC: 129 MG/DL (ref 65–140)
HCO3 BLDA-SCNC: 32.7 MMOL/L (ref 22–28)
HCT VFR BLD AUTO: 30.4 % (ref 34.8–46.1)
HCT VFR BLD CALC: 27 % (ref 34.8–46.1)
HGB BLD-MCNC: 8.8 G/DL (ref 11.5–15.4)
HGB BLDA-MCNC: 9.2 G/DL (ref 11.5–15.4)
INR PPP: 1.05 (ref 0.86–1.16)
LACTATE SERPL-SCNC: 1.7 MMOL/L (ref 0.5–2)
LYMPHOCYTES # BLD AUTO: 2.05 THOUSANDS/ΜL (ref 0.6–4.47)
LYMPHOCYTES NFR BLD AUTO: 20 % (ref 14–44)
MCH RBC QN AUTO: 27.5 PG (ref 26.8–34.3)
MCHC RBC AUTO-ENTMCNC: 28.9 G/DL (ref 31.4–37.4)
MCV RBC AUTO: 95 FL (ref 82–98)
MONOCYTES # BLD AUTO: 0.41 THOUSAND/ΜL (ref 0.17–1.22)
MONOCYTES NFR BLD AUTO: 4 % (ref 4–12)
NEUTROPHILS # BLD AUTO: 7.55 THOUSANDS/ΜL (ref 1.85–7.62)
NEUTS SEG NFR BLD AUTO: 74 % (ref 43–75)
NT-PROBNP SERPL-MCNC: ABNORMAL PG/ML
P AXIS: 58 DEGREES
PCO2 BLD: 34 MMOL/L (ref 21–32)
PCO2 BLD: 57.6 MM HG (ref 36–44)
PH BLD: 7.36 [PH] (ref 7.35–7.45)
PLATELET # BLD AUTO: 151 THOUSANDS/UL (ref 149–390)
PMV BLD AUTO: 13.3 FL (ref 8.9–12.7)
PO2 BLD: 160 MM HG (ref 75–129)
POTASSIUM BLD-SCNC: 3.8 MMOL/L (ref 3.5–5.3)
POTASSIUM SERPL-SCNC: 3.9 MMOL/L (ref 3.5–5.3)
PR INTERVAL: 240 MS
PRESSURE SETTING: 6
PROT SERPL-MCNC: 6.8 G/DL (ref 6.4–8.2)
PROTHROMBIN TIME: 14 SECONDS (ref 12.1–14.4)
QRS AXIS: 84 DEGREES
QRSD INTERVAL: 126 MS
QT INTERVAL: 366 MS
QTC INTERVAL: 474 MS
RBC # BLD AUTO: 3.2 MILLION/UL (ref 3.81–5.12)
RESPIRATORY RATE: 8
SAMPLE SITE: ABNORMAL
SAO2 % BLD FROM PO2: 99 % (ref 95–98)
SODIUM BLD-SCNC: 141 MMOL/L (ref 136–145)
SODIUM SERPL-SCNC: 143 MMOL/L (ref 136–145)
SPECIMEN SOURCE: ABNORMAL
T WAVE AXIS: -19 DEGREES
TROPONIN I SERPL-MCNC: 0.02 NG/ML
VENTILATION VALUE: 0
VENTRICULAR RATE: 101 BPM
WBC # BLD AUTO: 10.19 THOUSAND/UL (ref 4.31–10.16)

## 2017-07-03 PROCEDURE — 36415 COLL VENOUS BLD VENIPUNCTURE: CPT | Performed by: PHYSICIAN ASSISTANT

## 2017-07-03 PROCEDURE — 84484 ASSAY OF TROPONIN QUANT: CPT | Performed by: EMERGENCY MEDICINE

## 2017-07-03 PROCEDURE — 85025 COMPLETE CBC W/AUTO DIFF WBC: CPT | Performed by: PHYSICIAN ASSISTANT

## 2017-07-03 PROCEDURE — 99285 EMERGENCY DEPT VISIT HI MDM: CPT

## 2017-07-03 PROCEDURE — 85610 PROTHROMBIN TIME: CPT | Performed by: EMERGENCY MEDICINE

## 2017-07-03 PROCEDURE — 83880 ASSAY OF NATRIURETIC PEPTIDE: CPT | Performed by: PHYSICIAN ASSISTANT

## 2017-07-03 PROCEDURE — 82330 ASSAY OF CALCIUM: CPT

## 2017-07-03 PROCEDURE — 83605 ASSAY OF LACTIC ACID: CPT | Performed by: PHYSICIAN ASSISTANT

## 2017-07-03 PROCEDURE — 87147 CULTURE TYPE IMMUNOLOGIC: CPT | Performed by: PHYSICIAN ASSISTANT

## 2017-07-03 PROCEDURE — 84132 ASSAY OF SERUM POTASSIUM: CPT

## 2017-07-03 PROCEDURE — 80053 COMPREHEN METABOLIC PANEL: CPT | Performed by: PHYSICIAN ASSISTANT

## 2017-07-03 PROCEDURE — 93005 ELECTROCARDIOGRAM TRACING: CPT

## 2017-07-03 PROCEDURE — 82803 BLOOD GASES ANY COMBINATION: CPT

## 2017-07-03 PROCEDURE — 94760 N-INVAS EAR/PLS OXIMETRY 1: CPT

## 2017-07-03 PROCEDURE — 82947 ASSAY GLUCOSE BLOOD QUANT: CPT

## 2017-07-03 PROCEDURE — 85014 HEMATOCRIT: CPT

## 2017-07-03 PROCEDURE — 85730 THROMBOPLASTIN TIME PARTIAL: CPT | Performed by: EMERGENCY MEDICINE

## 2017-07-03 PROCEDURE — 71010 HB CHEST X-RAY 1 VIEW FRONTAL (PORTABLE): CPT

## 2017-07-03 PROCEDURE — 94660 CPAP INITIATION&MGMT: CPT

## 2017-07-03 PROCEDURE — 87040 BLOOD CULTURE FOR BACTERIA: CPT | Performed by: PHYSICIAN ASSISTANT

## 2017-07-03 PROCEDURE — 84295 ASSAY OF SERUM SODIUM: CPT

## 2017-07-03 RX ORDER — SERTRALINE HYDROCHLORIDE 25 MG/1
50 TABLET, FILM COATED ORAL DAILY
COMMUNITY

## 2017-07-03 RX ORDER — ACETAMINOPHEN 325 MG/1
650 TABLET ORAL EVERY 6 HOURS PRN
COMMUNITY

## 2017-07-03 RX ORDER — FERROUS SULFATE 325(65) MG
325 TABLET ORAL 2 TIMES DAILY WITH MEALS
COMMUNITY

## 2017-07-03 RX ORDER — NICOTINE POLACRILEX 2 MG
1 LOZENGE BUCCAL DAILY
COMMUNITY

## 2017-07-06 LAB
BACTERIA BLD CULT: NORMAL
GRAM STN SPEC: NORMAL

## 2017-07-08 LAB — BACTERIA BLD CULT: NORMAL

## 2018-01-10 NOTE — MISCELLANEOUS
Assessment    1  Anemia (285 9) (D64 9)   2  Anticoagulant long-term use (V58 61) (Z79 01)   3  Fall in home, initial encounter (J967 0,Y224 5) ([address]  XXXA,Y92 099)   4  Syncope (780 2) (R55)   5  Hyperkalemia (276 7) (E87 5)   6  DM2 (diabetes mellitus, type 2) (250 00) (E11 9)   7  Chronic venous insufficiency (459 81) (I87 2)    Plan  Anemia    · (1) CBC/PLT/DIFF; Status:Active; Requested for:42Oqu6470;    Perform:[facility]; CBX:74RIQ1511;QLIKCXH; Zuly Carrasco; Ordered By:Helen Hall; Anticoagulant long-term use    · (1) PT WITH INR; Status:Active; Requested for:80Win8360;    Perform:[facility]; QMM:02QGG5980;VQCSBTQ; For:Anticoagulant long-term use; Ordered By:Mallory Hall;  DM2 (diabetes mellitus, type 2)    · (1) HEMOGLOBIN A1C; Status:Active; Requested for:94Vnq5775;    Perform:[facility]; FKO:44MYT4636;XRMZDJO; For:DM2 (diabetes mellitus, type 2); Ordered By:Helen Hall; Hyperkalemia    · (1) BASIC METABOLIC PROFILE; Status:Active; Requested for:86Dyw5506;    Perform:[facility]; WSW:98GXJ5547;OYVDMAG;  Colton Joy; Ordered By:Mallory Hall;  Vitamin B12 deficiency    · Cyanocobalamin 1000 MCG/ML Injection Solution   Rx By: Crissy Oglesby; For: Vitamin B12 deficiency; Dose of 1 ML; Intramuscular; NEELAM = N; Administered by: Evan Lees: 8/25/2016 10:43:00 AM; Last Updated By: Evan Lees; 8/25/2016 10:42:20 AM    Discussion/Summary  Discussion Summary:   Will check labs next week  Continue follow up w/ wound care and home nursing  F/U here in 2 months  Sooner PRN  Medication SE Review and Pt Understands Tx: Possible side effects of new medications were reviewed with the patient/guardian today  The treatment plan was reviewed with the patient/guardian  The patient/guardian understands and agrees with the treatment plan      Chief Complaint  Chief Complaint Free Text Note Form: Patient is here for a hospital follow up        History of Present Illness  TCM Communication [facility]: The patient is being contacted for follow-up after hospitalization  Hospital records were reviewed  She was hospitalized at [facility]  The date of admission: 08/19/2016, date of discharge: 08/23/2016  Diagnosis: anemia, fall  She was discharged to home  The patient is currently asymptomatic  Counseling was provided to the patient  Communication performed and completed by   HPI: 80 yr old F who was recently admitted to [facility] for confusion and a fall  She was found on the side of the road - she stated she had been getting her nigel done and then went out side and apparently was found on the side of the road  She cannot remember anything from the event  It was unclear as to whether she had lost consciousness or not  She was found to have an elevated INR and anemia on admission  She required 2 units of blood  She also was noted to have hyperkalemia  She had extensive testing including a CXR which showed diffuse interstitial opacities , a CT of the Brain which showed large L parietal scalp hematoma and no evidence of acute intracranial hemorrhage  CT spine which showed cervical spondylosis and severe osteopenia  Currently she is home with home health care from [facility]  She has a lab slip for next week  She is having PT today  She has chronic LE venous insufficiency, she has follow up at the wound care center in [facility]  She denies any acute complaints today  Review of Systems  Complete-Female:   Constitutional: No fever, no chills, feels well, no tiredness, no recent weight gain or weight loss  Eyes: No complaints of eye pain, no red eyes, no eyesight problems, no discharge, no dry eyes, no itching of eyes  ENT: no complaints of earache, no loss of hearing, no nose bleeds, no nasal discharge, no sore throat, no hoarseness  Cardiovascular: lower extremity edema, but no chest pain     Respiratory: No complaints of shortness of breath, no wheezing, no cough, no SOB on exertion, no orthopnea, no PND  Integumentary: as noted in HPI  Neurological: No complaints of headache, no confusion, no convulsions, no numbness, no dizziness or fainting, no tingling, no limb weakness, no difficulty walking  Endocrine: No complaints of proptosis, no hot flashes, no muscle weakness, no deepening of the voice, no feelings of weakness  Hematologic/Lymphatic: No complaints of swollen glands, no swollen glands in the neck, does not bleed easily, does not bruise easily  Active Problems    1  Abnormal kidney function (593 9) (N28 9)   2  Anemia (285 9) (D64 9)   3  Anticoagulant long-term use (V58 61) (Z79 01)   4  Arthritis (716 90) (M19 90)   5  Benign essential hypertension (401 1) (I10)   6  Carotid artery stenosis (433 10) (I65 29)   7  Chronic venous insufficiency (459 81) (I87 2)   8  Dermatitis, stasis (454 1) (I83 10)   9  DM2 (diabetes mellitus, type 2) (250 00) (E11 9)   10  Gait disturbance (781 2) (R26 9)   11  Hyperlipidemia (272 4) (E78 5)   12  Low oxygen saturation (790 91) (R79 81)   13  Mouth dryness (527 7) (R68 2)   14  Nocturnal oxygen desaturation (327 24) (G47 34)   15  Stroke Syndrome (436)   16  Urge incontinence of urine (788 31) (N39 41)   17  Vitamin B12 deficiency (266 2) (E53 8)   18  Vitamin D deficiency (268 9) (E55 9)    Past Medical History    1  History of Abnormal blood chemistry (790 6) (R79 9)   2  Anemia (285 9) (D64 9)   3  History of Cellulitis Lower Extremities   4  History of Complete tear of right rotator cuff (727 61) (M75 121)   5  History of Fracture Of The Ankle (824 8)   6  Gait disturbance (781 2) (R26 9)   7  Mouth dryness (527 7) (R68 2)   8  Negative depression screening   9  History of Nephrolithiasis (V13 01)   10  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 (730 72)   11  History of Stroke Syndrome (436)   12  Stroke Syndrome (436)    Surgical History    1  History of Cataract Surgery   2   History of Cholecystectomy Laparoscopic   3  History of Hysterectomy   4  History of Salpingo-oophorectomy Bilateral  Surgical History Reviewed: The surgical history was reviewed and updated today  Family History  Mother    1  Denied: Family history of mental disorder   2  Denied: Family history of substance abuse   3  Family history of Uterine Cancer (V16 49)  Father    4  Denied: Family history of mental disorder   5  Denied: Family history of substance abuse  Family History Reviewed: The family history was reviewed and updated today  Social History    · Denied: Alcohol Use (History)   · Denied: Caffeine Use  Social History Reviewed: The social history was reviewed and updated today  Current Meds   1  AmLODIPine Besylate 10 MG Oral Tablet; take 1 tablet by mouth once daily; Therapy: 24OJN5155 to ([place])  Requested for: 65JQC2552; Last   Rx:95Xyd5839 Ordered   2  Atorvastatin Calcium 40 MG Oral Tablet; take 1 tablet by mouth once daily; Therapy: 03NYS8679 to (Evaluate:04Jun2016)  Requested for: 92ECC3331; Last   Rx:55Zlp5652 Ordered   3  Docusate Sodium 100 MG Oral Capsule Recorded   4  Doxazosin Mesylate 2 MG Oral Tablet; take 1 tablet by mouth once daily; Therapy: 38IHD8710 to (Evaluate:27Oct2016)  Requested for: 23Acg7073; Last   Rx:49Zum5739 Ordered   5  Latanoprost 0 005 % Ophthalmic Solution; Therapy: 68UPO9336 to (Evaluate:37Zlt6373) Recorded   6  MetFORMIN HCl - 500 MG Oral Tablet; Take 1 tablet daily; Therapy: 00GBR1511 to (Evaluate:22Jan2017)  Requested for: 27Spf9287; Last   Rx:35Lnk6935 Ordered   7  Metoprolol Tartrate 25 MG Oral Tablet; 12 5mg every twelve hours Recorded   8  Oxybutynin Chloride ER 15 MG Oral Tablet Extended Release 24 Hour; take 1 tablet by   mouth once daily; Therapy: 85Ysa7745 to (Felix [place])  Requested for: 90PLR4919; Last   Rx:30Boy4477 Ordered   9  Oxybutynin Chloride ER 5 MG Oral Tablet Extended Release 24 Hour;  Take 1 tablet daily; Therapy: 66RMR3554 to ([place])  Requested for: 46ERW7119; Last   Rx:02Ggw2699 Ordered   10  Quinapril HCl - 40 MG Oral Tablet; take 1 tablet by mouth once daily; Therapy: 19KRX1628 to (Evaluate:17Jan2017)  Requested for: 21Jun2016; Last    Rx:21Jun2016 Ordered   11  Warfarin Sodium 5 MG Oral Tablet; take 1 tablet by mouth once daily; Therapy: 20ERF5294 to (Liz Hush)  Requested for: 01Aug2016; Last    Rx:73Nad8470 Ordered  Medication List Reviewed: The medication list was reviewed and updated today  Allergies    1  Advil CAPS   2  Dyazide CAPS   3  Neosporin OINT   4  Plasma Human SOLN   5  Sulfa Drugs    Vitals  Signs   Recorded: 14CPI2553 96:61UI   Systolic: 657  Diastolic: 68  Heart Rate: 82  Temperature: 98 1 F  O2 Saturation: 91  Height: 4 ft 11 5 in  Weight: 177 lb 6 08 oz  BMI Calculated: 35 23  BSA Calculated: 1 76    Physical Exam    Constitutional   General appearance: Abnormal   chronically ill and obese  Eyes   Conjunctiva and lids: No swelling, erythema or discharge  Pupils and irises: Equal, round and reactive to light  Ears, Nose, Mouth, and Throat   Otoscopic examination: Tympanic membranes translucent with normal light reflex  Canals patent without erythema  Oropharynx: Normal with no erythema, edema, exudate or lesions  Pulmonary   Respiratory effort: No increased work of breathing or signs of respiratory distress  Auscultation of lungs: Clear to auscultation  Cardiovascular   Auscultation of heart: Abnormal   A grade 3 systolic murmur was heard at the RUSB  Examination of extremities for edema and/or varicosities: Abnormal   (Legs are wrapped w/ ace wraps ) bilateral ankle pitting edema  Abdomen   Abdomen: Abnormal   The abdomen was obese  The abdomen was soft and nontender  Musculoskeletal   Gait and station: Abnormal   Gait evaluation demonstrated stooping  Neurologic   Cranial nerves: Cranial nerves 2-12 intact      Psychiatric Orientation to person, place, and time: Normal     Mood and affect: Normal          Health Management  DM2 (diabetes mellitus, type 2)   (1) HEMOGLOBIN A1C; every 6 months; Last 86RNN6137; Next Due: 15Iwd9847; Near Due  (1) MICROALBUMIN CREATININE RATIO, RANDOM URINE; every 1 year; Last 02WXR3256; Next Due:  87JMI3680; Active  *VB - Eye Exam; every 1 year; Last 42Iar7288; Next Due: 98Kth7905; Overdue  *VB - Foot Exam; every 1 year; Last 22Apr2014; Next Due: 52HUE2969; Overdue  Health Maintenance   Medicare Annual Wellness Visit; every 1 year; Next Due: 62FBG2031;  Overdue    Future Appointments    Date/Time Provider Specialty Site   08/31/2016 09:20 AM Rhys Rpoer MD [address]   08/31/2016 09:40 AM Rhys Roper MD [address]     Signatures   Electronically signed by : Carin Clark MD; Aug 25 2016 10:45AM EST                       (Author)

## 2018-01-10 NOTE — MISCELLANEOUS
Message   Recorded as Task   Date: 11/14/2016 03:01 PM, Created ByJorge Sullivan   Task Name: Care Coordination   Assigned To: Mallory Hall   Regarding Patient: Seema aHrmon, Status: Active   CommentDiego Arnt - 14 Nov 2016 3:01 PM     TASK CREATED  Caller: kieran mckeon, Sponsored Dependent; [phone]  pt needs order for mikayla for pt/ & visiting nurse took him 1/2 to get out of car & into house total of 4 people to help move her  please call kieran when done  [phone]   Spoke to patient's daughter Irasi Carlos  She states Issac Hopes is having trouble getting in and out of the car  She states she had a bowel movement when she was getting out of the car  "She poops herself and sits in it " [facility] states she just does not care  SHe had seen GI and was on cholestyramine but she stopped taking it  She has done PT before and as soon as PT is there she will get out of her chair and do everything but as soon as they leaves she will not do anything  "She will literally sit in her recliner for 10 hours straight"  I explained that today in the office she had reported normal BMs and was not complaining of fecal incontinence  I also explained that she needs to use the medications that GI gave her  I asked her to try to get her take these as needed  Discussed I asked her about depression at the visit and she declined and does not want any meds like this  I did refer her to PT  I advised her to check into local nursing homes        Signatures   Electronically signed by : Taylor Randolph MD; Nov 14 2016  3:24PM EST                       (Author)

## 2018-01-12 VITALS
TEMPERATURE: 97.2 F | RESPIRATION RATE: 16 BRPM | SYSTOLIC BLOOD PRESSURE: 108 MMHG | HEART RATE: 62 BPM | DIASTOLIC BLOOD PRESSURE: 68 MMHG | OXYGEN SATURATION: 95 %

## 2018-01-13 VITALS — SYSTOLIC BLOOD PRESSURE: 98 MMHG | DIASTOLIC BLOOD PRESSURE: 60 MMHG | HEIGHT: 58 IN | HEART RATE: 84 BPM

## 2018-01-13 NOTE — PROCEDURES
Procedures by Sandra Sterling DO at  11/30/2016 11:38 AM      Author:  Sandra Sterling DO Service:  Interventional Radiology  Author Type:  Physician     Filed:  11/30/2016 11:41 AM Date of Service:  11/30/2016 11:38 AM Status:  Signed     :  Sandra Sterling DO (Physician)         Procedure Orders:       1  Thoracentesis [[id number]] ordered by Sandra Sterling DO at 11/30/16 1138                    Thoracentesis  Date/Time: 11/30/2016 11:38 AM  Performed by: Gissel Pak  Authorized by: Gissel Pak     Patient location:  Other (comment) (Rad/IR)  Other  Assisting Provider: No    Consent:     Consent obtained:  Written    Consent given by:  Patient    Risks discussed:  Bleeding, infection, pain, pneumothorax, incomplete drainage and nerve damage    Alternatives discussed:  No treatment, delayed treatment and observation  Universal protocol:     Procedure explained and questions answered to patient or proxy's satisfaction: yes      Relevant documents present and verified: yes      Test results available and properly labeled: yes       Imaging studies available: yes      Required blood products, implants, devices and special equipment available: yes      Site/side marked: yes      Immediately prior to procedure a time out was called: yes      Patient identity confirmed:  Verbally with patient and arm band  Indications:     Procedure Purpose: therapeutic      Indications: pleural effusion    Anesthesia (see MAR for exact dosages): Anesthesia method:  Local infiltration    Local anesthetic:  Lidocaine 2% w/o epi (5cc)  Procedure details:     Preparation: Patient was prepped and draped in usual sterile fashion      Standard thoracentesis cath kit used: Yes      Patient position:  Sitting    Location:  Posterior    Puncture method:  Over-the-needle catheter    Guidance: ultrasound      Reason for ultrasound: Identify fluid collection and guide cathetar placement        Images stored locally on hard drive Indwelling  catheter placed: no      Number of attempts:  1    Catheter size: 5Fr Yueh  Drainage color:  Clear and yellow    Drainage characteristics:  Clear    Fluid removed amount:  350 cc  Post-procedure details:     Chest x-ray performed: no      Patient tolerance of procedure:   Tolerated well, no immediate complications                 Received for:Jaswinder Heath DO  Nov 30 2016 11:41AM [facility] Standard Time

## 2018-01-16 NOTE — MISCELLANEOUS
To Whom It May Concern: This patient requires a lift chair to help her stand  If you have any questions please contact my office      Dodie Weathers MD      Electronically signed by:Mallory Hall MD  Oct 31 2016  1:07PM EST

## 2018-01-17 NOTE — PROGRESS NOTES
Assessment    1  Initial Medicare annual wellness visit (V70 0) (Z00 00)   2  Need for influenza vaccination (V04 81) (Z23)    Plan  Initial Medicare annual wellness visit    · Eat a low fat and low cholesterol diet ; Status:Complete;   Done: 74KRY1103   · There are many exercise options for seniors ; Status:Complete;   Done: 91MEN9853   · These are things you can do to prevent falls in and around the home ; Status:Complete;    Done: 21Syb0076   · We recommend a colonoscopy ; Status:Complete;   Done: 12AUX0616   · We recommend that you create an advance directive ; Status:Complete;   Done:  66LTG1940    Discussion/Summary    MOSES completed  Five Wishes packet provided  Flu vaccine  Zostavax ordered  Fall prevention discussed  Advised her to increase physical activity to lose weight  Impression: Initial Annual Wellness Visit, with preventive exam as well as age and risk appropriate counseling completed  Cardiovascular screening and counseling: the risks and benefits of screening were discussed and screening is current  Diabetes screening and counseling: the risks and benefits of screening were discussed and screening is current  Colorectal cancer screening and counseling: the risks and benefits of screening were discussed and the patient declines screening  Breast cancer screening and counseling: the risks and benefits of screening were discussed and screening is current  Cervical cancer screening and counseling: the patient declines screening and screening not indicated  Osteoporosis screening and counseling: the risks and benefits of screening were discussed and the patient declines screening  Abdominal aortic aneurysm screening and counseling: screening not indicated  Glaucoma screening and counseling: the risks and benefits of screening were discussed, screening is current and She states she has an eye exam coming up  HIV screening and counseling: screening not indicated   Immunizations: the risks and benefits of influenza vaccination were discussed with the patient, influenza vaccine is due today, the risks and benefits of pneumococcal vaccination were discussed with the patient, the patient declines the pneumococcal vaccination, the risks and benefits of the Zostavax vaccine were discussed with the patient and the patient declines the Zostavax vaccine  Advance Directive Planning: not complete, paperwork and instructions were given to the patient, she was encouraged to follow-up with me to discuss her questions and/or decisions  Patient Discussion: plan discussed with the patient, follow-up visit needed in 6 months   History of Present Illness  The patient is being seen for the initial annual wellness visit  Medicare Screening and Risk Factors   Hospitalizations: she has been previously hospitalizied and she has been hospitalized 1 times  Medicare Screening Tests Risk Questions   Abdominal aortic aneurysm risk assessment: none indicated  Osteoporosis risk assessment: , female gender and over 48years of age  HIV risk assessment: none indicated  Drug and Alcohol Use: The patient has never smoked cigarettes  The patient reports never drinking alcohol  She has never used illicit drugs  Diet and Physical Activity: Current diet includes well balanced meals  She exercises infrequently  Mood Disorder and Cognitive Impairment Screening:   Depression screening  negative for symptoms  She denies feeling down, depressed, or hopeless over the past two weeks  She denies feeling little interest or pleasure in doing things over the past two weeks  Functional Ability/Level of Safety: Hearing is normal bilaterally  She denies hearing difficulties  The patient is currently unable to participate in social activities and able to drive with limitations   Activities of daily living details: does not need help using the phone, no transportation help needed, does not need help shopping, no meal preparation help needed, does not need help doing housework, does not need help doing laundry, does not need help managing medications and does not need help managing money  Fall risk factors:  urinary incontinence and previous fall, but The patient fell 1 times in the past 12 months  Home safety risk factors:  no unfamiliar surroundings, no loose rugs, no poor household lighting, no uneven floors, no household clutter, grab bars in the bathroom and handrails on the stairs  Advance Directives: Advance directives: no living will and no advance directives  Co-Managers and Medical Equipment/Suppliers: See Patient Care Team      Review of Systems    Constitutional: negative  Eyes: negative  ENT: negative  Cardiovascular: negative  Respiratory: negative  Gastrointestinal: negative  Genitourinary: negative  Musculoskeletal: negative  Integumentary and Breasts: negative  Neurological: negative  Psychiatric: negative  Endocrine: negative  Hematologic and Lymphatic: negative  Over the past 2 weeks, how often have you been bothered by the following problems? 1 ) Little interest or pleasure in doing things? Not at all    2 ) Feeling down, depressed or hopeless? Not at all  Active Problems     1  Abnormal kidney function (593 9) (N28 9)   2  Anticoagulant long-term use (V58 61) (Z79 01)   3  Arthritis (716 90) (M19 90)   4  Carotid artery stenosis (433 10) (I65 29)   5  Chronic venous insufficiency (459 81) (I87 2)   6  Dermatitis, stasis (454 1) (I83 10)   7  DM2 (diabetes mellitus, type 2) (250 00) (E11 9)   8  Gait disturbance (781 2) (R26 9)   9  Hyperkalemia (276 7) (E87 5)   10  Low oxygen saturation (790 91) (R79 81)   11  Mouth dryness (527 7) (R68 2)   12  Nocturnal oxygen desaturation (327 24) (G47 34)   13  Stroke Syndrome (436)   14  Syncope (780 2) (R55)   15  Urge incontinence of urine (788 31) (N39 41)   16  Vitamin B12 deficiency (266 2) (E53 8)   17   Vitamin D deficiency (268 9) (E55 9)   18  Wound of left leg (891 0) (S81 802A)    Benign essential hypertension (401 1) (I10)       Hyperlipidemia (272 4) (E78 5)       Anemia (285 9) (D64 9)       Thrombocytopenia (287 5) (D69 6)          Past Medical History     1  History of Abnormal blood chemistry (790 6) (R79 9)   2  History of Cellulitis Lower Extremities   3  History of Complete tear of right rotator cuff (727 61) (M75 121)   4  History of Fracture Of The Ankle (824 8)   5  Gait disturbance (781 2) (R26 9)   6  H/O cancer of uterus (V10 42) (Z85 42)   7  Mouth dryness (527 7) (R68 2)   8  Negative depression screening   9  History of Nephrolithiasis (V13 01)   10  History of Polio (045 90) (A80 9)   11  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 (730 72)   12  Stroke Syndrome (436)   13  History of Stroke Syndrome (436)    The active problems and past medical history were reviewed and updated today  Anemia (285 9) (D64 9)             Surgical History    1  History of Cataract Surgery   2  History of Cholecystectomy Laparoscopic   3  History of Hysterectomy   4  History of Salpingo-oophorectomy Bilateral    The surgical history was reviewed and updated today  Family History  Mother    1  Denied: Family history of mental disorder   2  Denied: Family history of substance abuse   3  Family history of Uterine Cancer (V16 49)  Father    4  Denied: Family history of mental disorder   5  Denied: Family history of substance abuse    The family history was reviewed and updated today  Social History    · Denied: Alcohol Use (History)   · Denied: Caffeine Use   · Never a smoker  The social history was reviewed and updated today  Current Meds   1  Atorvastatin Calcium 40 MG Oral Tablet; take 1 tablet by mouth once daily; Therapy: 49FLB0902 to (Evaluate:04Jun2016)  Requested for: 44KAI5683; Last   Rx:42Fyl8091 Ordered   2   Cholestyramine 4 GM Oral Packet; MIX THE CONTENTS OF 1 POWDER PACKET WITH 2   TO 6 OZ OF NONCARBONATED BEVERAGE AND DRINK 3 TIMES DAILY; Therapy: 64Mes9326 to (Evaluate:27Jan2017)  Requested for: 25Ums1701; Last   Rx:42Ecg3790 Ordered   3  Docusate Sodium 100 MG Oral Capsule; Take 1 capsule twice daily as needed   Recorded   4  Doxazosin Mesylate 2 MG Oral Tablet; take 1 tablet by mouth once daily as directed; Therapy: 81HIO3612 to (Evaluate:19Apr2017)  Requested for: 21Oct2016; Last   Rx:21Oct2016 Ordered   5  Latanoprost 0 005 % Ophthalmic Solution; Therapy: 92MZI1354 to (Evaluate:29Juo1011) Recorded   6  MetFORMIN HCl - 500 MG Oral Tablet; Take 1 tablet daily; Therapy: 19WYB6888 to (Carter Smith)  Requested for: 84Hhb2693; Last   Rx:32Gzd9408 Ordered   7  Metoprolol Tartrate 25 MG Oral Tablet; 12 5mg every twelve hours Recorded   8  Oxybutynin Chloride ER 15 MG Oral Tablet Extended Release 24 Hour; take 1 tablet by   mouth once daily; Therapy: 12UAL9333 to (Evaluate:29Clc3479)  Requested for: 18JFA2077; Last   Rx:60Tjj4160 Ordered   9  Oxybutynin Chloride ER 5 MG Oral Tablet Extended Release 24 Hour; Take 1 tablet daily; Therapy: 21VNV2567 to (Colt Burrell)  Requested for: 30SZD6585; Last   Rx:96Tij9608 Ordered   10  Warfarin Sodium 5 MG Oral Tablet; take 1 tablet by mouth once daily; Therapy: 74ZTZ0568 to [phone])  Requested for: 29Yep1525; Last    Rx:26Eeg7850 Ordered    The medication list was reviewed and updated today  Allergies    1  Advil CAPS   2  Dyazide CAPS   3  Neosporin OINT   4  Plasma Human SOLN   5   Sulfa Drugs    Immunizations  Influenza --- Ximena Barnes: [id number] Felt: 24-Oct-2013; Aram Oneal: 21-Oct-2014; Series4:  23-Nov-2015   PPSV --- Ximena Barnes: Temporarily Deferred: Pt requests deferral   Tdap --- Ximena Barnes: Temporarily Deferred: Pt requests deferral; Jean Carter: Temporarily Deferred: Pt  requests deferral   Zoster --- Ximena Barnes: Temporarily Deferred: Pt requests deferral     Health Management  DM2 (diabetes mellitus, type 2)   (1) HEMOGLOBIN A1C; every 6 months; Last 45RRC6795; Next Due: 31Wtk0704; Overdue  (1) MICROALBUMIN CREATININE RATIO, RANDOM URINE; every 1 year; Last 25DOO5228; Next Due:  59VDM2102; Near Due  *VB - Eye Exam; every 1 year; Last 02Isy2357; Next Due: 10Duo4689; Overdue  *VB - Foot Exam; every 1 year; Last 97YOB7513; Next Due: 28LZJ6711; Active  Health Maintenance   Medicare Annual Wellness Visit; every 1 year; Next Due: 25KMD2553; Overdue    Future Appointments    Date/Time Provider Specialty Site   11/01/2016 11:15 AM ANDRE Stanton   [facility]  [place] [phone]     Signatures   Electronically signed by : Adilia Lizama MD; Oct 25 2016 11:05AM EST                       (Author)